# Patient Record
Sex: FEMALE
[De-identification: names, ages, dates, MRNs, and addresses within clinical notes are randomized per-mention and may not be internally consistent; named-entity substitution may affect disease eponyms.]

---

## 2017-04-28 ENCOUNTER — CHARTING TRANS (OUTPATIENT)
Dept: OTHER | Age: 57
End: 2017-04-28

## 2018-07-28 ENCOUNTER — HOSPITAL ENCOUNTER (EMERGENCY)
Dept: HOSPITAL 47 - EC | Age: 58
Discharge: HOME | End: 2018-07-28
Payer: COMMERCIAL

## 2018-07-28 VITALS — TEMPERATURE: 98.2 F

## 2018-07-28 VITALS — SYSTOLIC BLOOD PRESSURE: 116 MMHG | DIASTOLIC BLOOD PRESSURE: 72 MMHG | HEART RATE: 72 BPM | RESPIRATION RATE: 16 BRPM

## 2018-07-28 DIAGNOSIS — R51: ICD-10-CM

## 2018-07-28 DIAGNOSIS — R42: Primary | ICD-10-CM

## 2018-07-28 DIAGNOSIS — M47.812: ICD-10-CM

## 2018-07-28 DIAGNOSIS — Z88.2: ICD-10-CM

## 2018-07-28 DIAGNOSIS — Z88.6: ICD-10-CM

## 2018-07-28 DIAGNOSIS — E05.90: ICD-10-CM

## 2018-07-28 LAB
ALBUMIN SERPL-MCNC: 4.6 G/DL (ref 3.5–5)
ALP SERPL-CCNC: 51 U/L (ref 38–126)
ALT SERPL-CCNC: 24 U/L (ref 9–52)
ANION GAP SERPL CALC-SCNC: 8 MMOL/L
AST SERPL-CCNC: 21 U/L (ref 14–36)
BASOPHILS # BLD AUTO: 0 K/UL (ref 0–0.2)
BASOPHILS NFR BLD AUTO: 0 %
BUN SERPL-SCNC: 13 MG/DL (ref 7–17)
CALCIUM SPEC-MCNC: 9.6 MG/DL (ref 8.4–10.2)
CHLORIDE SERPL-SCNC: 106 MMOL/L (ref 98–107)
CO2 SERPL-SCNC: 27 MMOL/L (ref 22–30)
EOSINOPHIL # BLD AUTO: 0.3 K/UL (ref 0–0.7)
EOSINOPHIL NFR BLD AUTO: 4 %
ERYTHROCYTE [DISTWIDTH] IN BLOOD BY AUTOMATED COUNT: 5 M/UL (ref 3.8–5.4)
ERYTHROCYTE [DISTWIDTH] IN BLOOD: 12.3 % (ref 11.5–15.5)
GLUCOSE SERPL-MCNC: 92 MG/DL (ref 74–99)
HCT VFR BLD AUTO: 43.4 % (ref 34–46)
HGB BLD-MCNC: 14.3 GM/DL (ref 11.4–16)
LYMPHOCYTES # SPEC AUTO: 1.5 K/UL (ref 1–4.8)
LYMPHOCYTES NFR SPEC AUTO: 21 %
MCH RBC QN AUTO: 28.5 PG (ref 25–35)
MCHC RBC AUTO-ENTMCNC: 32.8 G/DL (ref 31–37)
MCV RBC AUTO: 86.7 FL (ref 80–100)
MONOCYTES # BLD AUTO: 0.3 K/UL (ref 0–1)
MONOCYTES NFR BLD AUTO: 4 %
NEUTROPHILS # BLD AUTO: 5 K/UL (ref 1.3–7.7)
NEUTROPHILS NFR BLD AUTO: 70 %
PH UR: 7.5 [PH] (ref 5–8)
PLATELET # BLD AUTO: 271 K/UL (ref 150–450)
POTASSIUM SERPL-SCNC: 4.1 MMOL/L (ref 3.5–5.1)
PROT SERPL-MCNC: 7.3 G/DL (ref 6.3–8.2)
SODIUM SERPL-SCNC: 141 MMOL/L (ref 137–145)
SP GR UR: 1.01 (ref 1–1.03)
SQUAMOUS UR QL AUTO: <1 /HPF (ref 0–4)
T4 FREE SERPL-MCNC: 0.86 NG/DL (ref 0.78–2.19)
UROBILINOGEN UR QL STRIP: <2 MG/DL (ref ?–2)
WBC # BLD AUTO: 7.2 K/UL (ref 3.8–10.6)
WBC #/AREA URNS HPF: 8 /HPF (ref 0–5)

## 2018-07-28 PROCEDURE — 96374 THER/PROPH/DIAG INJ IV PUSH: CPT

## 2018-07-28 PROCEDURE — 93005 ELECTROCARDIOGRAM TRACING: CPT

## 2018-07-28 PROCEDURE — 96361 HYDRATE IV INFUSION ADD-ON: CPT

## 2018-07-28 PROCEDURE — 85025 COMPLETE CBC W/AUTO DIFF WBC: CPT

## 2018-07-28 PROCEDURE — 84443 ASSAY THYROID STIM HORMONE: CPT

## 2018-07-28 PROCEDURE — 99284 EMERGENCY DEPT VISIT MOD MDM: CPT

## 2018-07-28 PROCEDURE — 80053 COMPREHEN METABOLIC PANEL: CPT

## 2018-07-28 PROCEDURE — 84439 ASSAY OF FREE THYROXINE: CPT

## 2018-07-28 PROCEDURE — 72040 X-RAY EXAM NECK SPINE 2-3 VW: CPT

## 2018-07-28 PROCEDURE — 81001 URINALYSIS AUTO W/SCOPE: CPT

## 2018-07-28 PROCEDURE — 84484 ASSAY OF TROPONIN QUANT: CPT

## 2018-07-28 PROCEDURE — 36415 COLL VENOUS BLD VENIPUNCTURE: CPT

## 2018-07-28 PROCEDURE — 71046 X-RAY EXAM CHEST 2 VIEWS: CPT

## 2018-07-28 NOTE — XR
EXAMINATION TYPE: XR chest 2V

 

DATE OF EXAM: 7/28/2018

 

HISTORY: Pain.

 

REFERENCE: NONE.

 

FINDINGS: The lungs are clear. Pleural space are clear. The heart is not enlarged.

 

IMPRESSION: 

 

NORMAL CHEST.

## 2018-07-28 NOTE — ED
Dizziness HPI





- General


Chief Complaint: Dizziness


Stated Complaint: Dizziness


Time Seen by Provider: 18 08:05


Source: patient, RN notes reviewed, old records reviewed


Mode of arrival: ambulatory


Limitations: no limitations





- History of Present Illness


Initial Comments: 





58-year-old female presents emergency room today chief complaint of onset of 

dizziness.  Worse for the past 2 weeks.  She reports that she return to the 

right feels that the room is spinning.  Patient states that she has been trying 

to do pressure points to manage this.  Patient states that she has had a very 

stressful year.  Patient states she's been taking care of her family and has 

not been able to really take care of herself.  She went to her PCP.  Weeks ago 

and was told that she was apparently fired from seeing her original PCP.  

Patient states that she has a history of thyroid disorder.  This time she 

states that she did have some headaches, does have a history of multiple neck 

traumas in the past.  She reports no recent trauma or new injuries to her head 

or neck.  Patient states that she's had no fevers or chills.  Denies any other 

symptoms.





- Related Data


 Previous Rx's











 Medication  Instructions  Recorded


 


Meclizine [Antivert] 12.5 mg PO Q8HR #20 tablet 18


 


Ondansetron Odt [Zofran Odt] 4 mg PO Q12HR PRN #20 tab 18











 Allergies











Allergy/AdvReac Type Severity Reaction Status Date / Time


 


ibuprofen [From Motrin] Allergy  Unknown Verified 18 08:04


 


Sulfa (Sulfonamide Allergy  Unknown Verified 18 08:04





Antibiotics)     














Review of Systems


ROS Statement: 


Those systems with pertinent positive or pertinent negative responses have been 

documented in the HPI.





ROS Other: All systems not noted in ROS Statement are negative.





Past Medical History


Past Medical History: Thyroid Disorder


History of Any Multi-Drug Resistant Organisms: None Reported


Past Surgical History:  Section


Past Psychological History: No Psychological Hx Reported


Smoking Status: Never smoker


Past Alcohol Use History: Occasional


Past Drug Use History: None Reported





General Exam





- General Exam Comments


Initial Comments: 





38-year-old female.  Alert and oriented.  No acute distress.


Limitations: no limitations


General appearance: alert, in no apparent distress


Head exam: Present: atraumatic, normocephalic, normal inspection


Eye exam: Present: normal appearance, PERRL, EOMI, nystagmus (Evidence of 

horizontal nystagmus on right sided gaze.  Patient reports that she is very 

dizzy when she looks towards the right.).  Absent: scleral icterus, 

conjunctival injection, periorbital swelling


ENT exam: Present: normal exam, mucous membranes moist


Neck exam: Present: normal inspection.  Absent: tenderness, meningismus, 

lymphadenopathy


Respiratory exam: Present: normal lung sounds bilaterally.  Absent: respiratory 

distress, wheezes, rales, rhonchi, stridor


Cardiovascular Exam: Present: regular rate, normal rhythm, normal heart sounds.

  Absent: systolic murmur, diastolic murmur, rubs, gallop, clicks


GI/Abdominal exam: Present: soft, normal bowel sounds.  Absent: distended, 

tenderness, guarding, rebound, rigid


Back exam: Present: normal inspection


Neurological exam: Present: alert


Psychiatric exam: Present: normal affect, normal mood


Skin exam: Present: warm, dry, intact, normal color.  Absent: rash





Course


 Vital Signs











  18





  08:01


 


Temperature 98.2 F


 


Pulse Rate 80


 


Respiratory 20





Rate 


 


Blood Pressure 131/84


 


O2 Sat by Pulse 99





Oximetry 














Medical Decision Making





- Medical Decision Making





Patient's x-ray female presents by psych chief complaint of dizziness.  Patient 

reports that she turns her head of the right she has room spinning sensation.  

She also has been having more stressful time with the past year.  Patient's 

given IV fluids and obtained.  Laboratory was reviewed and unremarkable.  Chest 

x-ray was normal.  Cervical x-rays she did complain of some minor neck pain was 

doing some degenerative changes C5-C6.  Patient provisional.  Patient did show 

evidence of a low TSH.  Patient does state that she's been taking natural 

thyroid medication.  She does have an appointment with the PCP next week.  

Discussed that she needs to follow up without to have her thyroid levels 

rechecked in a gesture medication.  Patient agrees.  Patient will be discharged 

at this time with meclizine, Apley's maneuver and Zofran.  Discussed PCP follow-

up P Patient history plan will comply.  Return parameters were discussed.





- Lab Data


Result diagrams: 


 18 09:15





 18 09:15


 Lab Results











  18 Range/Units





  09:15 09:15 09:15 


 


WBC   7.2   (3.8-10.6)  k/uL


 


RBC   5.00   (3.80-5.40)  m/uL


 


Hgb   14.3   (11.4-16.0)  gm/dL


 


Hct   43.4   (34.0-46.0)  %


 


MCV   86.7   (80.0-100.0)  fL


 


MCH   28.5   (25.0-35.0)  pg


 


MCHC   32.8   (31.0-37.0)  g/dL


 


RDW   12.3   (11.5-15.5)  %


 


Plt Count   271   (150-450)  k/uL


 


Neutrophils %   70   %


 


Lymphocytes %   21   %


 


Monocytes %   4   %


 


Eosinophils %   4   %


 


Basophils %   0   %


 


Neutrophils #   5.0   (1.3-7.7)  k/uL


 


Lymphocytes #   1.5   (1.0-4.8)  k/uL


 


Monocytes #   0.3   (0-1.0)  k/uL


 


Eosinophils #   0.3   (0-0.7)  k/uL


 


Basophils #   0.0   (0-0.2)  k/uL


 


Sodium  141    (137-145)  mmol/L


 


Potassium  4.1    (3.5-5.1)  mmol/L


 


Chloride  106    ()  mmol/L


 


Carbon Dioxide  27    (22-30)  mmol/L


 


Anion Gap  8    mmol/L


 


BUN  13    (7-17)  mg/dL


 


Creatinine  0.59    (0.52-1.04)  mg/dL


 


Est GFR (CKD-EPI)AfAm  >90    (>60 ml/min/1.73 sqM)  


 


Est GFR (CKD-EPI)NonAf  >90    (>60 ml/min/1.73 sqM)  


 


Glucose  92    (74-99)  mg/dL


 


Calcium  9.6    (8.4-10.2)  mg/dL


 


Total Bilirubin  0.6    (0.2-1.3)  mg/dL


 


AST  21    (14-36)  U/L


 


ALT  24    (9-52)  U/L


 


Alkaline Phosphatase  51    ()  U/L


 


Troponin I    <0.012  (0.000-0.034)  ng/mL


 


Total Protein  7.3    (6.3-8.2)  g/dL


 


Albumin  4.6    (3.5-5.0)  g/dL


 


TSH  0.228 L    (0.465-4.680)  mIU/L


 


Urine Color     


 


Urine Appearance     (Clear)  


 


Urine pH     (5.0-8.0)  


 


Ur Specific Gravity     (1.001-1.035)  


 


Urine Protein     (Negative)  


 


Urine Glucose (UA)     (Negative)  


 


Urine Ketones     (Negative)  


 


Urine Blood     (Negative)  


 


Urine Nitrite     (Negative)  


 


Urine Bilirubin     (Negative)  


 


Urine Urobilinogen     (<2.0)  mg/dL


 


Ur Leukocyte Esterase     (Negative)  


 


Urine WBC     (0-5)  /hpf


 


Ur Squamous Epith Cells     (0-4)  /hpf


 


Amorphous Sediment     (None)  /hpf


 


Urine Mucus     (None)  /hpf














  18 Range/Units





  09:15 


 


WBC   (3.8-10.6)  k/uL


 


RBC   (3.80-5.40)  m/uL


 


Hgb   (11.4-16.0)  gm/dL


 


Hct   (34.0-46.0)  %


 


MCV   (80.0-100.0)  fL


 


MCH   (25.0-35.0)  pg


 


MCHC   (31.0-37.0)  g/dL


 


RDW   (11.5-15.5)  %


 


Plt Count   (150-450)  k/uL


 


Neutrophils %   %


 


Lymphocytes %   %


 


Monocytes %   %


 


Eosinophils %   %


 


Basophils %   %


 


Neutrophils #   (1.3-7.7)  k/uL


 


Lymphocytes #   (1.0-4.8)  k/uL


 


Monocytes #   (0-1.0)  k/uL


 


Eosinophils #   (0-0.7)  k/uL


 


Basophils #   (0-0.2)  k/uL


 


Sodium   (137-145)  mmol/L


 


Potassium   (3.5-5.1)  mmol/L


 


Chloride   ()  mmol/L


 


Carbon Dioxide   (22-30)  mmol/L


 


Anion Gap   mmol/L


 


BUN   (7-17)  mg/dL


 


Creatinine   (0.52-1.04)  mg/dL


 


Est GFR (CKD-EPI)AfAm   (>60 ml/min/1.73 sqM)  


 


Est GFR (CKD-EPI)NonAf   (>60 ml/min/1.73 sqM)  


 


Glucose   (74-99)  mg/dL


 


Calcium   (8.4-10.2)  mg/dL


 


Total Bilirubin   (0.2-1.3)  mg/dL


 


AST   (14-36)  U/L


 


ALT   (9-52)  U/L


 


Alkaline Phosphatase   ()  U/L


 


Troponin I   (0.000-0.034)  ng/mL


 


Total Protein   (6.3-8.2)  g/dL


 


Albumin   (3.5-5.0)  g/dL


 


TSH   (0.465-4.680)  mIU/L


 


Urine Color  Yellow  


 


Urine Appearance  Cloudy H  (Clear)  


 


Urine pH  7.5  (5.0-8.0)  


 


Ur Specific Gravity  1.015  (1.001-1.035)  


 


Urine Protein  Negative  (Negative)  


 


Urine Glucose (UA)  Negative  (Negative)  


 


Urine Ketones  Negative  (Negative)  


 


Urine Blood  Negative  (Negative)  


 


Urine Nitrite  Negative  (Negative)  


 


Urine Bilirubin  Negative  (Negative)  


 


Urine Urobilinogen  <2.0  (<2.0)  mg/dL


 


Ur Leukocyte Esterase  Moderate H  (Negative)  


 


Urine WBC  8 H  (0-5)  /hpf


 


Ur Squamous Epith Cells  <1  (0-4)  /hpf


 


Amorphous Sediment  Occasional H  (None)  /hpf


 


Urine Mucus  Few H  (None)  /hpf














18 10:24


EKG shows normal sinus abnormal EKG.  Ventricular rate 67 bpm period.  Normal 

is 1:30 milliseconds.  QRS ration 74 ms.  QT QTc is 422/445 ms.  No evidence of 

ST elevation or T-wave inversion.  No evidence of atrial or ventricular 

arrhythmias.





- Radiology Data


Radiology results: report reviewed


Tests cervical spine x-ray shows no acute osseous changes.  Myalgias and 

changes.  It mild disease at C5-C6.  Normal chest x-ray.  Heart is not enlarged.





Disposition


Clinical Impression: 


 Vertigo, Hyperthyroidism





Disposition: HOME SELF-CARE


Condition: Good


Instructions:  Vertigo (ED)


Additional Instructions: 


Patient has follow-up with primary care provider.  Return to the emergency 

department if any alarming signs or symptoms occur.


Prescriptions: 


Meclizine [Antivert] 12.5 mg PO Q8HR #20 tablet


Ondansetron Odt [Zofran Odt] 4 mg PO Q12HR PRN #20 tab


 PRN Reason: Nausea


Is patient prescribed a controlled substance at d/c from ED?: No


Referrals: 


None,Stated [Primary Care Provider] - 1-2 days


Elle Benítez MD [STAFF PHYSICIAN] - 1-2 days


Time of Disposition: 10:37

## 2018-07-28 NOTE — XR
EXAMINATION TYPE: XR cervical spine limited , 3 VIEWS

 

DATE OF EXAM ORDERED: 7/28/2018

 

HISTORY: Pain.

 

COMPARISON: None.

 

FINDINGS:  Vertebral body height and alignment are maintained. Atlantoaxial relationships are normal.
 Prevertebral soft tissues are normal. There is mild uncovertebral joint disease at C5-6. There is al
so mild disc space loss at this level.

 

IMPRESSION: 

1. NO ACUTE OSSEOUS LESION.

2. MILD DEGENERATIVE CHANGE.

## 2018-11-03 VITALS
HEIGHT: 68 IN | HEART RATE: 68 BPM | TEMPERATURE: 98 F | BODY MASS INDEX: 18.94 KG/M2 | RESPIRATION RATE: 16 BRPM | SYSTOLIC BLOOD PRESSURE: 108 MMHG | DIASTOLIC BLOOD PRESSURE: 66 MMHG | WEIGHT: 125 LBS

## 2019-03-02 ENCOUNTER — HOSPITAL ENCOUNTER (EMERGENCY)
Dept: HOSPITAL 47 - EC | Age: 59
Discharge: HOME | End: 2019-03-02
Payer: COMMERCIAL

## 2019-03-02 VITALS
SYSTOLIC BLOOD PRESSURE: 151 MMHG | DIASTOLIC BLOOD PRESSURE: 80 MMHG | RESPIRATION RATE: 18 BRPM | HEART RATE: 100 BPM | TEMPERATURE: 97.9 F

## 2019-03-02 DIAGNOSIS — G58.9: Primary | ICD-10-CM

## 2019-03-02 DIAGNOSIS — Z88.6: ICD-10-CM

## 2019-03-02 DIAGNOSIS — Z88.2: ICD-10-CM

## 2019-03-02 PROCEDURE — 99284 EMERGENCY DEPT VISIT MOD MDM: CPT

## 2019-03-02 PROCEDURE — 93005 ELECTROCARDIOGRAM TRACING: CPT

## 2019-03-02 NOTE — ED
Dizziness HPI





- General


Chief Complaint: Dizziness


Stated Complaint: Dizzy, scalp numbness


Time Seen by Provider: 19 14:59


Source: patient, RN notes reviewed, old records reviewed


Mode of arrival: ambulatory


Limitations: no limitations





- History of Present Illness


Initial Comments: 





Patient is a 58-year-old female who presents emergency murmurs today with 

complaints of paresthesias over the posterior scalp and behind her left ear.  

Patient reports that she's been having the symptoms that she had her hair 

washed.  She reports that she may have compressed a nerve within her neck.  

Patient states she was doing a lot of neck and yogurt exercises yesterday.  

Patient reports that today she felt like her "head and neck exploded".  Patient 

states that she felt slightly dizzy at that time.  She does complain of a mild 

tension headache.  Patient states that she has no other neurological deficits 

or symptoms for which she denies any chest pain or shortness of breath.





- Related Data


 Previous Rx's











 Medication  Instructions  Recorded


 


Meclizine [Antivert] 12.5 mg PO Q8HR #20 tablet 18


 


Ondansetron Odt [Zofran Odt] 4 mg PO Q12HR PRN #20 tab 18


 


Cyclobenzaprine [Flexeril] 10 mg PO TID #12 tab 19











 Allergies











Allergy/AdvReac Type Severity Reaction Status Date / Time


 


ibuprofen [From Motrin] Allergy  Unknown Verified 19 14:50


 


Sulfa (Sulfonamide Allergy  Unknown Verified 19 14:50





Antibiotics)     














Review of Systems


ROS Statement: 


Those systems with pertinent positive or pertinent negative responses have been 

documented in the HPI.





ROS Other: All systems not noted in ROS Statement are negative.





Past Medical History


Past Medical History: Thyroid Disorder


Additional Past Medical History / Comment(s): vertigo


History of Any Multi-Drug Resistant Organisms: None Reported


Past Surgical History: Breast Surgery,  Section


Additional Past Surgical History / Comment(s): breast augmentation


Past Psychological History: No Psychological Hx Reported


Smoking Status: Never smoker


Past Alcohol Use History: Occasional


Past Drug Use History: None Reported





General Exam





- General Exam Comments


Initial Comments: 





This is a well-appearing 58-year-old female.  No distress.


Limitations: no limitations


General appearance: alert, in no apparent distress, other (Patient has 

tenderness over the left posterior scalp and behind the ear.)


Head exam: Present: atraumatic, normocephalic, normal inspection


Eye exam: Present: normal appearance, PERRL, EOMI.  Absent: scleral icterus, 

conjunctival injection, periorbital swelling


ENT exam: Present: normal exam, mucous membranes moist


Neck exam: Present: normal inspection.  Absent: tenderness, meningismus, 

lymphadenopathy


Respiratory exam: Present: normal lung sounds bilaterally.  Absent: respiratory 

distress, wheezes, rales, rhonchi, stridor


Cardiovascular Exam: Present: regular rate, normal rhythm, normal heart sounds.

  Absent: systolic murmur, diastolic murmur, rubs, gallop, clicks


GI/Abdominal exam: Present: soft, normal bowel sounds.  Absent: distended, 

tenderness, guarding, rebound, rigid


Extremities exam: Present: normal inspection, full ROM, normal capillary 

refill.  Absent: tenderness, pedal edema, joint swelling, calf tenderness


Back exam: Present: normal inspection


Neurological exam: Present: alert, oriented X3, CN II-XII intact, normal gait, 

reflexes normal


  ** Expanded


Patient oriented to: Present: person, place, time


Speech: Present: fluid speech


Cranial nerves: EOM's Intact: Normal


Cerebellar function: Finger to Nose: Normal


Upper motor neuron: Darrel Neglect: Normal, Pronator Drift: Normal


Sensory exam: Upper Extremity Light Touch: Normal, Lower Extremity Light Touch: 

Normal


Motor strength exam: RUE: 5, LUE: 5, RLE: 5, LLE: 5


Eye Response: (4) open spontaneously


Motor Response: (6) obeys commands


Verbal Response: (5) oriented


[place] Total: 15


Psychiatric exam: Present: normal affect, normal mood


Skin exam: Present: warm, dry, intact, normal color.  Absent: rash





Course


 Vital Signs











  19





  14:50


 


Temperature 97.9 F


 


Pulse Rate 100


 


Respiratory 18





Rate 


 


Blood Pressure 151/80


 


O2 Sat by Pulse 98





Oximetry 














Medical Decision Making





- Medical Decision Making





Patient is a 38-year-old female who presents today with paresthesias over the 

posterior scalp.  Symptoms started B conduction had her hair done.  She did a 

lot of yogurt exercises yesterday.  At this time Patient has some tenderness 

over the posterior scalp and spine.  I discussed this likely a pinched nerve.  

She has no carotid bruits.  She has no neurological deficits.  She otherwise 

appears well.  I discussed the Patient could follow up with her PCP and will 

prescribe Patient a short course of muscle relaxers and steroid for anti-

inflammatory medications.  Discussed that she could follow-up with her PCP as 

well as chiropractic services.  All questions were answered return parameters 

were discussed.





Disposition


Clinical Impression: 


 Pinched nerve in neck





Disposition: HOME SELF-CARE


Condition: Good


Instructions (If sedation given, give patient instructions):  Cervical 

Radiculopathy (ED)


Additional Instructions: 


Patient should follow-up with your PCP and chiropractic services.  Take muscle 

relaxers and Tylenol for pain.  No sees a steroid for anti-inflammatory 

process.  Return to the emergency department if any alarming signs or symptoms 

occur.


Prescriptions: 


Cyclobenzaprine [Flexeril] 10 mg PO TID #12 tab


Is patient prescribed a controlled substance at d/c from ED?: No


Referrals: 


Miguel A Tomlinson MD [Primary Care Provider] - 1-2 days


Time of Disposition: 15:39

## 2019-03-15 ENCOUNTER — HOSPITAL ENCOUNTER (EMERGENCY)
Dept: HOSPITAL 47 - EC | Age: 59
LOS: 1 days | Discharge: HOME | End: 2019-03-16
Payer: COMMERCIAL

## 2019-03-15 VITALS — RESPIRATION RATE: 18 BRPM

## 2019-03-15 DIAGNOSIS — Z88.2: ICD-10-CM

## 2019-03-15 DIAGNOSIS — L08.9: Primary | ICD-10-CM

## 2019-03-15 DIAGNOSIS — Z23: ICD-10-CM

## 2019-03-15 DIAGNOSIS — Z88.6: ICD-10-CM

## 2019-03-15 PROCEDURE — 90471 IMMUNIZATION ADMIN: CPT

## 2019-03-15 PROCEDURE — 90715 TDAP VACCINE 7 YRS/> IM: CPT

## 2019-03-15 PROCEDURE — 99283 EMERGENCY DEPT VISIT LOW MDM: CPT

## 2019-03-16 VITALS — HEART RATE: 78 BPM | TEMPERATURE: 98.2 F | DIASTOLIC BLOOD PRESSURE: 99 MMHG | SYSTOLIC BLOOD PRESSURE: 134 MMHG

## 2019-03-16 NOTE — XR
EXAM:

  XR Left Foot Complete, 3 or More Views

 

CLINICAL HISTORY:

  ITS.REASON XR Reason: R/O foreign body

 

TECHNIQUE:

  Frontal, lateral and oblique views of the left foot.

 

COMPARISON:

  No relevant prior studies available.

 

FINDINGS:

  Bones/joints:  Mild plantar calcaneal spur noted.  No acute fracture.  

No dislocation.

  Soft tissues:  No radiopaque foreign body.

 

IMPRESSION: No radiopaque foreign body identified.  Mild plantar 

calcaneal spur noted.

## 2019-03-16 NOTE — ED
Skin/Abscess/FB HPI





- General


Chief complaint: Skin/Abscess/Foreign Body


Stated complaint: Redness lt foot


Time Seen by Provider: 03/15/19 23:51


Source: patient


Mode of arrival: ambulatory


Limitations: no limitations





- History of Present Illness


Initial comments: 





This patient is a 59-year-old woman who presents with concern that she may be 

developing infection to the posterior aspect of her left foot.  The patient 

states she had been walking on a beach in [place] and she stepped on something. 

She states that she then pulled a small black spine out of the skin overlying 

the left Achilles tendon.  She states that the following day she had been 

wearing some new shoes that rubbed on that area and resulted in a blister 

forming.  She states that over the next couple days she noted redness and warmth

to the area.  She is concerned there may be an early infection developing.  She 

denies any systemic symptoms.  She has not had fever or chills.  No p

alpitations, dyspnea, chest pain or any other symptoms.


MD complaint: discoloration, other (Possible infection)


-: days(s)


Location: L foot


Severity: mild


Improves with: none


Worsens with: none


Associated symptoms: denies other symptoms


Treatments Prior to Arrival: none





- Related Data


                                  Previous Rx's











 Medication  Instructions  Recorded


 


Meclizine [Antivert] 12.5 mg PO Q8HR #20 tablet 18


 


Ondansetron Odt [Zofran Odt] 4 mg PO Q12HR PRN #20 tab 18


 


Cyclobenzaprine [Flexeril] 10 mg PO TID #12 tab 19


 


Cephalexin [Keflex] 500 mg PO Q6HR #28 cap 19











                                    Allergies











Allergy/AdvReac Type Severity Reaction Status Date / Time


 


ibuprofen [From Motrin] Allergy  Unknown Verified 03/15/19 23:50


 


Sulfa (Sulfonamide Allergy  Unknown Verified 03/15/19 23:50





Antibiotics)     














Review of Systems


ROS Statement: 


Those systems with pertinent positive or pertinent negative responses have been 

documented in the HPI.





ROS Other: All systems not noted in ROS Statement are negative.


Constitutional: Denies: fever, chills


Respiratory: Denies: cough, dyspnea


Cardiovascular: Denies: chest pain, palpitations


Skin: Reports: as per HPI, other (See above)





Past Medical History


Past Medical History: Thyroid Disorder


Additional Past Medical History / Comment(s): vertigo


History of Any Multi-Drug Resistant Organisms: None Reported


Past Surgical History: Breast Surgery,  Section


Additional Past Surgical History / Comment(s): breast augmentation


Past Psychological History: No Psychological Hx Reported


Smoking Status: Never smoker


Past Alcohol Use History: Occasional


Past Drug Use History: None Reported





General Exam


Limitations: no limitations


General appearance: alert, in no apparent distress


Skin exam: Present: warm, dry, other (Patient has an approximately 0.5-1 cm x 

1.5-2 cm bulla to the posterior aspect of the left foot over the Achilles 

tendon.  There is also a little bit of surrounding erythema and warmth.  No 

purulent drainage.  No lymphangitis.  No palpable nodes.)





Course


                                   Vital Signs











  03/15/19





  23:46


 


Temperature 98.3 F


 


Pulse Rate 95


 


Respiratory 18





Rate 


 


Blood Pressure 153/93


 


O2 Sat by Pulse 99





Oximetry 














Disposition


Clinical Impression: 


 Wound infection





Disposition: HOME SELF-CARE


Condition: Good


Instructions (If sedation given, give patient instructions):  Wound Infection 

(ED)


Prescriptions: 


Cephalexin [Keflex] 500 mg PO Q6HR #28 cap


Is patient prescribed a controlled substance at d/c from ED?: No


Referrals: 


Miguel A Tomlinson MD [Primary Care Provider] - 1-2 days

## 2019-06-05 ENCOUNTER — HOSPITAL ENCOUNTER (OUTPATIENT)
Dept: HOSPITAL 47 - EC | Age: 59
Setting detail: OBSERVATION
Discharge: HOME | End: 2019-06-05
Payer: COMMERCIAL

## 2019-06-05 VITALS — BODY MASS INDEX: 18.5 KG/M2

## 2019-06-05 VITALS — TEMPERATURE: 97.9 F | SYSTOLIC BLOOD PRESSURE: 108 MMHG | DIASTOLIC BLOOD PRESSURE: 68 MMHG | HEART RATE: 75 BPM

## 2019-06-05 VITALS — RESPIRATION RATE: 16 BRPM

## 2019-06-05 DIAGNOSIS — Z88.2: ICD-10-CM

## 2019-06-05 DIAGNOSIS — J34.0: Primary | ICD-10-CM

## 2019-06-05 DIAGNOSIS — Z79.890: ICD-10-CM

## 2019-06-05 DIAGNOSIS — E03.9: ICD-10-CM

## 2019-06-05 DIAGNOSIS — Z88.6: ICD-10-CM

## 2019-06-05 LAB
ALBUMIN SERPL-MCNC: 4.6 G/DL (ref 3.5–5)
ALP SERPL-CCNC: 56 U/L (ref 38–126)
ALT SERPL-CCNC: 16 U/L (ref 9–52)
ANION GAP SERPL CALC-SCNC: 9 MMOL/L
AST SERPL-CCNC: 22 U/L (ref 14–36)
BASOPHILS # BLD AUTO: 0.1 K/UL (ref 0–0.2)
BASOPHILS NFR BLD AUTO: 1 %
BUN SERPL-SCNC: 18 MG/DL (ref 7–17)
CALCIUM SPEC-MCNC: 9.5 MG/DL (ref 8.4–10.2)
CHLORIDE SERPL-SCNC: 108 MMOL/L (ref 98–107)
CO2 SERPL-SCNC: 25 MMOL/L (ref 22–30)
EOSINOPHIL # BLD AUTO: 0.5 K/UL (ref 0–0.7)
EOSINOPHIL NFR BLD AUTO: 5 %
ERYTHROCYTE [DISTWIDTH] IN BLOOD BY AUTOMATED COUNT: 4.79 M/UL (ref 3.8–5.4)
ERYTHROCYTE [DISTWIDTH] IN BLOOD: 13.6 % (ref 11.5–15.5)
GLUCOSE SERPL-MCNC: 97 MG/DL (ref 74–99)
HCT VFR BLD AUTO: 41.2 % (ref 34–46)
HGB BLD-MCNC: 13.7 GM/DL (ref 11.4–16)
LYMPHOCYTES # SPEC AUTO: 2.1 K/UL (ref 1–4.8)
LYMPHOCYTES NFR SPEC AUTO: 21 %
MCH RBC QN AUTO: 28.7 PG (ref 25–35)
MCHC RBC AUTO-ENTMCNC: 33.4 G/DL (ref 31–37)
MCV RBC AUTO: 86.1 FL (ref 80–100)
MONOCYTES # BLD AUTO: 0.6 K/UL (ref 0–1)
MONOCYTES NFR BLD AUTO: 6 %
NEUTROPHILS # BLD AUTO: 6.5 K/UL (ref 1.3–7.7)
NEUTROPHILS NFR BLD AUTO: 65 %
PLATELET # BLD AUTO: 285 K/UL (ref 150–450)
POTASSIUM SERPL-SCNC: 3.9 MMOL/L (ref 3.5–5.1)
PROT SERPL-MCNC: 7.3 G/DL (ref 6.3–8.2)
SODIUM SERPL-SCNC: 142 MMOL/L (ref 137–145)
WBC # BLD AUTO: 9.9 K/UL (ref 3.8–10.6)

## 2019-06-05 PROCEDURE — 36415 COLL VENOUS BLD VENIPUNCTURE: CPT

## 2019-06-05 PROCEDURE — 96366 THER/PROPH/DIAG IV INF ADDON: CPT

## 2019-06-05 PROCEDURE — 96361 HYDRATE IV INFUSION ADD-ON: CPT

## 2019-06-05 PROCEDURE — 80053 COMPREHEN METABOLIC PANEL: CPT

## 2019-06-05 PROCEDURE — 85025 COMPLETE CBC W/AUTO DIFF WBC: CPT

## 2019-06-05 PROCEDURE — 96367 TX/PROPH/DG ADDL SEQ IV INF: CPT

## 2019-06-05 PROCEDURE — 96365 THER/PROPH/DIAG IV INF INIT: CPT

## 2019-06-05 PROCEDURE — 87040 BLOOD CULTURE FOR BACTERIA: CPT

## 2019-06-05 PROCEDURE — 99285 EMERGENCY DEPT VISIT HI MDM: CPT

## 2019-06-05 NOTE — HP
HISTORY AND PHYSICAL



CHIEF COMPLAINT:

Abscess in the left nostril.



HISTORY OF PRESENT ILLNESS:

This is another admission for this 59-year-old white female.  She apparently developed

an infection in the distal nasal septum, more around the left nostril, and it became

progressively swollen, erythematous, hard and tender.  She came to the emergency room,

where she was diagnosed as having an abscess in the area of the left nostril.  She has

otherwise been in good health.  The patient does not remember any trauma in that area.



REVIEW OF SYSTEMS:

She has had no fever, chills, chest pain, abdominal pain, nausea, vomiting, diarrhea,

urinary complaints, etc.



Past medical history, family history, and personal and social histories are all

otherwise unremarkable and noncontributory.  She does not remember any trauma to the

nose.  She does remember when she was much younger having a laceration in that area.



The only medication she is on is [place] [facility].  She is ALLERGIC to SULFA and NSAIDs.

She is a nonsmoker and drinks occasionally.  She did have some Keflex at home and took

two of them, but that did not seem to make any difference.



PHYSICAL EXAMINATION:

Blood pressure 140/86, pulse 78, respirations 16 and temperature 99. In general she

appeared to be well developed, well nourished, in no acute distress.  Skin color is

normal. Skin is warm and dry. Lymph nodes are not enlarged.  Head, ears, eyes, nose,

mouth and throat were negative.  She had some cellulitis, redness, tenderness and

swelling just medial and below the left nostril. Lymph nodes are not enlarged.  Chest

is clear.  Cardiac exam is normal.  Abdomen is soft, nontender.  Extremities are

normal.



IMPRESSION:

1. Cellulitis or abscess in the area of the left nostril.

2. Hypothyroidism.



PLAN:

1. Bed rest.

2. IV fluids.

3. IV antibiotics.

4. ENT consult.





MMODL / IJN: [id number] / Job#: [id number]

## 2019-06-05 NOTE — ED
ENT HPI





- General


Chief complaint: ENT


Stated complaint: Cyst Lft Nostril


Time Seen by Provider: 19 02:20


Source: patient


Mode of arrival: ambulatory


Limitations: no limitations





- History of Present Illness


Initial comments: 


59-year-old female patient presents to the emergency department today for 

evaluation of pain, swelling to the left nostril.  Patient states that she 

noticed some swelling earlier in the day.  Patient states that she took a couple

of keflex capsules that she had at home.  States that she tried to go to bed 

however the pain worsened so she got up to inspect the nose. She states that the

swelling was significantly worse.  Patient denies any fever or chills with this.

 The states that she can feel drainage down the back of her throat which is 

causing a burning sensation.  She denies taking any medication for her symptoms 

other than the keflex. Patient denies any recent rash, shortness breath, chest 

pain, abdominal pain, nausea, vomiting, diarrhea, constipation, back pain, 

numbness, tingling, dizziness, weakness, hematuria, dysuria, urinary urgency, 

urinary frequency, headache, visual changes, or any other complaints.





- Related Data


                                  Previous Rx's











 Medication  Instructions  Recorded


 


Meclizine [Antivert] 12.5 mg PO Q8HR #20 tablet 18


 


Ondansetron Odt [Zofran Odt] 4 mg PO Q12HR PRN #20 tab 18


 


Cyclobenzaprine [Flexeril] 10 mg PO TID #12 tab 19


 


Cephalexin [Keflex] 500 mg PO Q6HR #28 cap 19











                                    Allergies











Allergy/AdvReac Type Severity Reaction Status Date / Time


 


ibuprofen [From Motrin] Allergy  Unknown Verified 19 01:43


 


Sulfa (Sulfonamide Allergy  Unknown Verified 19 01:43





Antibiotics)     














Review of Systems


ROS Statement: 


Those systems with pertinent positive or pertinent negative responses have been 

documented in the HPI.





ROS Other: All systems not noted in ROS Statement are negative.





Past Medical History


Past Medical History: Thyroid Disorder


Additional Past Medical History / Comment(s): vertigo


History of Any Multi-Drug Resistant Organisms: None Reported


Past Surgical History: Breast Surgery,  Section


Additional Past Surgical History / Comment(s): breast augmentation


Past Psychological History: No Psychological Hx Reported


Smoking Status: Never smoker


Past Alcohol Use History: Occasional


Past Drug Use History: None Reported





General Exam


Limitations: no limitations


General appearance: alert, in no apparent distress, other (Physical well-

developed, well-nourished adult female patient in no acute distress.  Vital 

signs upon presentation are temperature 98.4F, pulse 85, respirations 20, blood

pressure 136/81, pulse ox 99% on room air.)


Eye exam: Present: normal appearance, PERRL, EOMI.  Absent: scleral icterus, 

conjunctival injection, periorbital swelling


ENT exam: Present: mucous membranes moist, other (Abscess noted to the left 

nare, swelling extending down to the left upper lip, no evidence of drainage at 

this time).  Absent: normal exam


Respiratory exam: Present: normal lung sounds bilaterally.  Absent: respiratory 

distress, wheezes, rales, rhonchi, stridor


Cardiovascular Exam: Present: regular rate, normal rhythm, normal heart sounds. 

Absent: systolic murmur, diastolic murmur, rubs, gallop, clicks


Neurological exam: Present: alert, oriented X3, CN II-XII intact


Psychiatric exam: Present: normal affect, normal mood


Skin exam: Present: warm, dry, intact, normal color.  Absent: rash





Course


                                   Vital Signs











  19





  01:39


 


Temperature 98.4 F


 


Pulse Rate 85


 


Respiratory 20





Rate 


 


Blood Pressure 136/81


 


O2 Sat by Pulse 99





Oximetry 














Medical Decision Making





- Medical Decision Making


59 year-old female patient presented to the emergency department today for 

evaluation of abscess to the left nare.  Physical examination did reveal 

significant swelling and tenderness over the left upper lip and nare.  Labs 

reviewed and are unremarkable.  Given area of abscess we will admit to the 

hospital for further evaluation and consultation by ENT.  Dr. Tomlinson is 

accepting








- Lab Data


Result diagrams: 


                                 19 03:15





                                 19 03:15


                                   Lab Results











  19 Range/Units





  03:15 03:15 


 


WBC  9.9   (3.8-10.6)  k/uL


 


RBC  4.79   (3.80-5.40)  m/uL


 


Hgb  13.7   (11.4-16.0)  gm/dL


 


Hct  41.2   (34.0-46.0)  %


 


MCV  86.1   (80.0-100.0)  fL


 


MCH  28.7   (25.0-35.0)  pg


 


MCHC  33.4   (31.0-37.0)  g/dL


 


RDW  13.6   (11.5-15.5)  %


 


Plt Count  285   (150-450)  k/uL


 


Neutrophils %  65   %


 


Lymphocytes %  21   %


 


Monocytes %  6   %


 


Eosinophils %  5   %


 


Basophils %  1   %


 


Neutrophils #  6.5   (1.3-7.7)  k/uL


 


Lymphocytes #  2.1   (1.0-4.8)  k/uL


 


Monocytes #  0.6   (0-1.0)  k/uL


 


Eosinophils #  0.5   (0-0.7)  k/uL


 


Basophils #  0.1   (0-0.2)  k/uL


 


Sodium   142  (137-145)  mmol/L


 


Potassium   3.9  (3.5-5.1)  mmol/L


 


Chloride   108 H  ()  mmol/L


 


Carbon Dioxide   25  (22-30)  mmol/L


 


Anion Gap   9  mmol/L


 


BUN   18 H  (7-17)  mg/dL


 


Creatinine   0.66  (0.52-1.04)  mg/dL


 


Est GFR (CKD-EPI)AfAm   >90  (>60 ml/min/1.73 sqM)  


 


Est GFR (CKD-EPI)NonAf   >90  (>60 ml/min/1.73 sqM)  


 


Glucose   97  (74-99)  mg/dL


 


Calcium   9.5  (8.4-10.2)  mg/dL


 


Total Bilirubin   0.6  (0.2-1.3)  mg/dL


 


AST   22  (14-36)  U/L


 


ALT   16  (9-52)  U/L


 


Alkaline Phosphatase   56  ()  U/L


 


Total Protein   7.3  (6.3-8.2)  g/dL


 


Albumin   4.6  (3.5-5.0)  g/dL














Disposition


Clinical Impression: 


 Nasal abscess





Disposition: ADMITTED AS IP TO THIS [facility]


Condition: Serious


Referrals: 


Miguel A Tomlinson MD [Primary Care Provider] - 1-2 days


Decision to Admit Reason: Admit from EC


Decision Date: 19


Decision Time: 04:40

## 2019-06-06 NOTE — DS
DISCHARGE SUMMARY



CHIEF COMPLAINT:

Cellulitis or abscess in the left nostril.



HISTORY OF PRESENT ILLNESS AND PHYSICAL EXAM:

Details of this lady's history and physical can be found in the initial workup.



LABORATORY STUDIES:

While she was in a hospital she had laboratory studies, details of which can be found

in the laboratory section of her chart.



COURSE IN HOSPITAL:

After admission she was placed on bedrest, started on intravenous fluids and IV

antibiotics.  The area of induration, swelling and pain around the left nostril receded

quite quickly.  She was referred to ENT, but they could not get in to see her until the

evening and the swelling, pain, redness and induration were largely gone.  It was felt

that she could go home.  She will go home on Keflex 500 mg 4 times a day and be seen in

the office in several days.



FINAL DIAGNOSES:

1. Cellulitis or abscess in the left nostril.

2. Hypothyroidism.



OPERATIONS:

None.



CONSULTATIONS:

None.



She is improved.





MMODL / BILLN: [id number] / Job#: [id number]

## 2019-06-21 ENCOUNTER — HOSPITAL ENCOUNTER (EMERGENCY)
Dept: HOSPITAL 47 - EC | Age: 59
Discharge: HOME | End: 2019-06-21
Payer: COMMERCIAL

## 2019-06-21 VITALS
RESPIRATION RATE: 20 BRPM | HEART RATE: 94 BPM | SYSTOLIC BLOOD PRESSURE: 163 MMHG | TEMPERATURE: 97.7 F | DIASTOLIC BLOOD PRESSURE: 91 MMHG

## 2019-06-21 DIAGNOSIS — E07.9: ICD-10-CM

## 2019-06-21 DIAGNOSIS — Z79.899: ICD-10-CM

## 2019-06-21 DIAGNOSIS — Z88.2: ICD-10-CM

## 2019-06-21 DIAGNOSIS — Z88.6: ICD-10-CM

## 2019-06-21 DIAGNOSIS — Z22.322: Primary | ICD-10-CM

## 2019-06-21 PROCEDURE — 99283 EMERGENCY DEPT VISIT LOW MDM: CPT
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
## 2020-02-08 ENCOUNTER — HOSPITAL ENCOUNTER (EMERGENCY)
Dept: HOSPITAL 47 - EC | Age: 60
Discharge: HOME | End: 2020-02-08
Payer: COMMERCIAL

## 2020-02-08 VITALS
DIASTOLIC BLOOD PRESSURE: 86 MMHG | SYSTOLIC BLOOD PRESSURE: 124 MMHG | RESPIRATION RATE: 16 BRPM | HEART RATE: 75 BPM | TEMPERATURE: 98 F

## 2020-02-08 VITALS
SYSTOLIC BLOOD PRESSURE: 124 MMHG | RESPIRATION RATE: 18 BRPM | TEMPERATURE: 97.6 F | HEART RATE: 84 BPM | DIASTOLIC BLOOD PRESSURE: 81 MMHG

## 2020-02-08 DIAGNOSIS — N39.0: ICD-10-CM

## 2020-02-08 DIAGNOSIS — E07.9: ICD-10-CM

## 2020-02-08 DIAGNOSIS — Z79.890: ICD-10-CM

## 2020-02-08 DIAGNOSIS — N39.0: Primary | ICD-10-CM

## 2020-02-08 DIAGNOSIS — Z88.2: ICD-10-CM

## 2020-02-08 DIAGNOSIS — Z88.1: ICD-10-CM

## 2020-02-08 DIAGNOSIS — Z86.14: ICD-10-CM

## 2020-02-08 DIAGNOSIS — Z88.6: ICD-10-CM

## 2020-02-08 DIAGNOSIS — R07.89: ICD-10-CM

## 2020-02-08 DIAGNOSIS — Z88.7: ICD-10-CM

## 2020-02-08 DIAGNOSIS — R06.02: Primary | ICD-10-CM

## 2020-02-08 DIAGNOSIS — T36.1X5A: ICD-10-CM

## 2020-02-08 LAB
PH UR: 5.5 [PH] (ref 5–8)
RBC UR QL: 3 /HPF (ref 0–5)
SP GR UR: 1.02 (ref 1–1.03)
SQUAMOUS UR QL AUTO: 5 /HPF (ref 0–4)
UROBILINOGEN UR QL STRIP: <2 MG/DL (ref ?–2)
WBC # UR AUTO: 52 /HPF (ref 0–5)

## 2020-02-08 PROCEDURE — 96372 THER/PROPH/DIAG INJ SC/IM: CPT

## 2020-02-08 PROCEDURE — 64450 NJX AA&/STRD OTHER PN/BRANCH: CPT

## 2020-02-08 PROCEDURE — 81001 URINALYSIS AUTO W/SCOPE: CPT

## 2020-02-08 PROCEDURE — 99284 EMERGENCY DEPT VISIT MOD MDM: CPT

## 2020-02-08 PROCEDURE — 87086 URINE CULTURE/COLONY COUNT: CPT
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
## 2020-02-11 NOTE — CDI
Dear Tom Zarate PA-C:



Please do addendum procedure related to Lidocaine HCL as given in Medication 
List.



Thank You,



Lisette PIERRE,

.



If you have any questions, please contact  at [phone].

[facility]